# Patient Record
Sex: MALE | Race: WHITE | NOT HISPANIC OR LATINO | Employment: STUDENT | URBAN - METROPOLITAN AREA
[De-identification: names, ages, dates, MRNs, and addresses within clinical notes are randomized per-mention and may not be internally consistent; named-entity substitution may affect disease eponyms.]

---

## 2024-11-22 ENCOUNTER — APPOINTMENT (OUTPATIENT)
Dept: PRIMARY CARE | Facility: CLINIC | Age: 19
End: 2024-11-22
Payer: COMMERCIAL

## 2024-11-22 VITALS
OXYGEN SATURATION: 97 % | HEIGHT: 73 IN | BODY MASS INDEX: 26.24 KG/M2 | SYSTOLIC BLOOD PRESSURE: 118 MMHG | DIASTOLIC BLOOD PRESSURE: 76 MMHG | HEART RATE: 64 BPM | WEIGHT: 198 LBS

## 2024-11-22 DIAGNOSIS — Z00.00 PHYSICAL EXAM: Primary | ICD-10-CM

## 2024-11-22 DIAGNOSIS — M54.9 BACK PAIN, UNSPECIFIED BACK LOCATION, UNSPECIFIED BACK PAIN LATERALITY, UNSPECIFIED CHRONICITY: ICD-10-CM

## 2024-11-22 DIAGNOSIS — Z76.89 ENCOUNTER TO ESTABLISH CARE: ICD-10-CM

## 2024-11-22 PROCEDURE — 1036F TOBACCO NON-USER: CPT | Performed by: EMERGENCY MEDICINE

## 2024-11-22 PROCEDURE — G0444 DEPRESSION SCREEN ANNUAL: HCPCS | Performed by: EMERGENCY MEDICINE

## 2024-11-22 PROCEDURE — G0442 ANNUAL ALCOHOL SCREEN 15 MIN: HCPCS | Performed by: EMERGENCY MEDICINE

## 2024-11-22 PROCEDURE — 99385 PREV VISIT NEW AGE 18-39: CPT | Performed by: EMERGENCY MEDICINE

## 2024-11-22 PROCEDURE — 3008F BODY MASS INDEX DOCD: CPT | Performed by: EMERGENCY MEDICINE

## 2024-11-22 ASSESSMENT — PROMIS GLOBAL HEALTH SCALE
CARRYOUT_SOCIAL_ACTIVITIES: GOOD
RATE_SOCIAL_SATISFACTION: GOOD
RATE_AVERAGE_PAIN: 2
EMOTIONAL_PROBLEMS: SOMETIMES
RATE_QUALITY_OF_LIFE: VERY GOOD
CARRYOUT_PHYSICAL_ACTIVITIES: COMPLETELY
RATE_GENERAL_HEALTH: FAIR
RATE_AVERAGE_FATIGUE: MILD
RATE_MENTAL_HEALTH: VERY GOOD
RATE_PHYSICAL_HEALTH: GOOD

## 2024-11-22 ASSESSMENT — PATIENT HEALTH QUESTIONNAIRE - PHQ9
SUM OF ALL RESPONSES TO PHQ9 QUESTIONS 1 AND 2: 0
2. FEELING DOWN, DEPRESSED OR HOPELESS: NOT AT ALL
1. LITTLE INTEREST OR PLEASURE IN DOING THINGS: NOT AT ALL

## 2024-11-22 NOTE — PROGRESS NOTES
"Subjective   Patient ID: Johnny Alfonso is a 19 y.o. male who presents for Establish Care and Back Pain (Upper left back pain ).    HPI   19 year old male presents for new patient physical    Presents for long term health maintenance and medication management     No significant medical history    Patient denies smoking and drinks occasionally    Review of Systems  Comprehensive review of symptoms was not positive for any symptoms other than HPI.     Objective   /76   Pulse 64   Ht 1.854 m (6' 1\")   Wt 89.8 kg (198 lb)   SpO2 97%   BMI 26.12 kg/m²     Physical Exam  General appearance: Alert and in no acute distress  HEENT: Normal Inspection.  Neck: Normal Inspectiopn  Respiratory: No respiratory distress.   Cardiovascular: heart rate normal. No gallop  Back: normal inspection  Skin inspection: Warm  Musculoskeletal: No deformities  Neuro: Grossly Intact  Psychiatric: Cooperative    Assessment/Plan     19 year old male presents for new patient physical    Back pain - Patient has some upper spinal pain specifically when he takes a deep breath. Counseled patient to take aleve or motrin as necessary, we will continue to monitor. If pain persists we will consider more imaging.      PH Q-9 depression screening was completed by authorized employee of the practice for 5-10 minutes and  explained the questionnaire and discussed the answers with the patient.    Alcohol screening was completed for 5 to 10 minutes      Follow up in 1 year or as necessary    "